# Patient Record
Sex: FEMALE | Race: BLACK OR AFRICAN AMERICAN | NOT HISPANIC OR LATINO | Employment: OTHER | URBAN - METROPOLITAN AREA
[De-identification: names, ages, dates, MRNs, and addresses within clinical notes are randomized per-mention and may not be internally consistent; named-entity substitution may affect disease eponyms.]

---

## 2023-07-02 ENCOUNTER — OFFICE VISIT (OUTPATIENT)
Dept: URGENT CARE | Facility: URGENT CARE | Age: 23
End: 2023-07-02
Payer: COMMERCIAL

## 2023-07-02 VITALS
DIASTOLIC BLOOD PRESSURE: 80 MMHG | SYSTOLIC BLOOD PRESSURE: 114 MMHG | WEIGHT: 194 LBS | HEART RATE: 78 BPM | OXYGEN SATURATION: 100 % | RESPIRATION RATE: 18 BRPM | TEMPERATURE: 97.8 F

## 2023-07-02 DIAGNOSIS — R10.13 ABDOMINAL PAIN, EPIGASTRIC: Primary | ICD-10-CM

## 2023-07-02 LAB
ALBUMIN SERPL-MCNC: 3.9 G/DL (ref 3.4–5)
ALBUMIN UR-MCNC: NEGATIVE MG/DL
ALP SERPL-CCNC: 62 U/L (ref 40–150)
ALT SERPL W P-5'-P-CCNC: 23 U/L (ref 0–50)
ANION GAP SERPL CALCULATED.3IONS-SCNC: 4 MMOL/L (ref 3–14)
APPEARANCE UR: CLEAR
AST SERPL W P-5'-P-CCNC: 25 U/L (ref 0–45)
BACTERIA #/AREA URNS HPF: ABNORMAL /HPF
BASOPHILS # BLD AUTO: 0 10E3/UL (ref 0–0.2)
BASOPHILS NFR BLD AUTO: 1 %
BILIRUB SERPL-MCNC: 0.6 MG/DL (ref 0.2–1.3)
BILIRUB UR QL STRIP: NEGATIVE
BUN SERPL-MCNC: 10 MG/DL (ref 7–30)
CALCIUM SERPL-MCNC: 10.1 MG/DL (ref 8.5–10.1)
CHLORIDE BLD-SCNC: 104 MMOL/L (ref 94–109)
CO2 SERPL-SCNC: 29 MMOL/L (ref 20–32)
COLOR UR AUTO: YELLOW
CREAT SERPL-MCNC: 0.6 MG/DL (ref 0.52–1.04)
EOSINOPHIL # BLD AUTO: 0.1 10E3/UL (ref 0–0.7)
EOSINOPHIL NFR BLD AUTO: 2 %
ERYTHROCYTE [DISTWIDTH] IN BLOOD BY AUTOMATED COUNT: 13.2 % (ref 10–15)
GFR SERPL CREATININE-BSD FRML MDRD: >90 ML/MIN/1.73M2
GLUCOSE BLD-MCNC: 104 MG/DL (ref 70–99)
GLUCOSE UR STRIP-MCNC: NEGATIVE MG/DL
HCT VFR BLD AUTO: 43.2 % (ref 35–47)
HGB BLD-MCNC: 14.1 G/DL (ref 11.7–15.7)
HGB UR QL STRIP: ABNORMAL
IMM GRANULOCYTES # BLD: 0 10E3/UL
IMM GRANULOCYTES NFR BLD: 1 %
KETONES UR STRIP-MCNC: NEGATIVE MG/DL
LEUKOCYTE ESTERASE UR QL STRIP: ABNORMAL
LIPASE SERPL-CCNC: 16 U/L (ref 13–60)
LYMPHOCYTES # BLD AUTO: 2 10E3/UL (ref 0.8–5.3)
LYMPHOCYTES NFR BLD AUTO: 33 %
MCH RBC QN AUTO: 28.7 PG (ref 26.5–33)
MCHC RBC AUTO-ENTMCNC: 32.6 G/DL (ref 31.5–36.5)
MCV RBC AUTO: 88 FL (ref 78–100)
MONOCYTES # BLD AUTO: 0.5 10E3/UL (ref 0–1.3)
MONOCYTES NFR BLD AUTO: 9 %
NEUTROPHILS # BLD AUTO: 3.4 10E3/UL (ref 1.6–8.3)
NEUTROPHILS NFR BLD AUTO: 56 %
NITRATE UR QL: NEGATIVE
PH UR STRIP: 5.5 [PH] (ref 5–7)
PLATELET # BLD AUTO: 313 10E3/UL (ref 150–450)
POTASSIUM BLD-SCNC: 4.3 MMOL/L (ref 3.4–5.3)
PROT SERPL-MCNC: 8.1 G/DL (ref 6.8–8.8)
RBC # BLD AUTO: 4.91 10E6/UL (ref 3.8–5.2)
RBC #/AREA URNS AUTO: ABNORMAL /HPF
SODIUM SERPL-SCNC: 137 MMOL/L (ref 133–144)
SP GR UR STRIP: >=1.03 (ref 1–1.03)
SQUAMOUS #/AREA URNS AUTO: ABNORMAL /LPF
UROBILINOGEN UR STRIP-ACNC: 0.2 E.U./DL
WBC # BLD AUTO: 6.1 10E3/UL (ref 4–11)
WBC #/AREA URNS AUTO: ABNORMAL /HPF

## 2023-07-02 PROCEDURE — 81001 URINALYSIS AUTO W/SCOPE: CPT | Performed by: NURSE PRACTITIONER

## 2023-07-02 PROCEDURE — 99204 OFFICE O/P NEW MOD 45 MIN: CPT | Performed by: NURSE PRACTITIONER

## 2023-07-02 PROCEDURE — 85025 COMPLETE CBC W/AUTO DIFF WBC: CPT | Performed by: NURSE PRACTITIONER

## 2023-07-02 PROCEDURE — 80053 COMPREHEN METABOLIC PANEL: CPT | Performed by: NURSE PRACTITIONER

## 2023-07-02 PROCEDURE — 36415 COLL VENOUS BLD VENIPUNCTURE: CPT | Performed by: NURSE PRACTITIONER

## 2023-07-02 PROCEDURE — 83690 ASSAY OF LIPASE: CPT | Performed by: NURSE PRACTITIONER

## 2023-07-02 RX ORDER — ONDANSETRON 4 MG/1
4 TABLET, ORALLY DISINTEGRATING ORAL EVERY 8 HOURS PRN
Qty: 15 TABLET | Refills: 0 | Status: SHIPPED | OUTPATIENT
Start: 2023-07-02 | End: 2023-07-07

## 2023-07-02 RX ORDER — FAMOTIDINE 20 MG/1
20 TABLET, FILM COATED ORAL 2 TIMES DAILY
Qty: 10 TABLET | Refills: 0 | Status: SHIPPED | OUTPATIENT
Start: 2023-07-02 | End: 2023-07-07

## 2023-07-02 RX ORDER — ONDANSETRON 4 MG/1
4 TABLET, ORALLY DISINTEGRATING ORAL ONCE
Status: COMPLETED | OUTPATIENT
Start: 2023-07-02 | End: 2023-07-02

## 2023-07-02 RX ADMIN — ONDANSETRON 4 MG: 4 TABLET, ORALLY DISINTEGRATING ORAL at 10:32

## 2023-07-02 ASSESSMENT — ENCOUNTER SYMPTOMS
VOMITING: 1
COUGH: 0
HEMATOCHEZIA: 0
DIARRHEA: 0
ABDOMINAL PAIN: 1
FATIGUE: 0
NAUSEA: 1
RHINORRHEA: 0
FEVER: 0
ABDOMINAL DISTENTION: 0
CONSTIPATION: 0

## 2023-07-02 NOTE — LETTER
July 2, 2023      Derek Gerardo  [address] [address]        To Whom It May Concern:    Derek Gerardo  was seen on 07/2/2023.  Please excuse her  until 07/06/2023 due to illness.        Sincerely,        REX LOPEZ CNP

## 2023-07-02 NOTE — PATIENT INSTRUCTIONS
Try the zofran and famotidine for the next few days. If anything worsens or you are still unable to tolerate fluids, please go to ER for further evaluation.

## 2023-07-02 NOTE — PROGRESS NOTES
Assessment & Plan       ICD-10-CM    1. Abdominal pain, epigastric  R10.13 UA Macroscopic with reflex to Microscopic and Culture     CBC with platelets and differential     Comprehensive metabolic panel (BMP + Alb, Alk Phos, ALT, AST, Total. Bili, TP)     Lipase     ondansetron (ZOFRAN ODT) ODT tab 4 mg     UA Macroscopic with reflex to Microscopic and Culture     CBC with platelets and differential     Comprehensive metabolic panel (BMP + Alb, Alk Phos, ALT, AST, Total. Bili, TP)     Lipase     UA Microscopic with Reflex to Culture     ondansetron (ZOFRAN ODT) 4 MG ODT tab     famotidine (PEPCID) 20 MG tablet     Primary Care Referral           Patient instructions:  Take zofran and famotidine as needed. If no improvement in symptoms or any worsening pain please go to ER, otherwise follow up with PCP in next week.     Medical decision making:  Pt presents with vomiting and mid epigastric discomfort x 1 week. Ddx includes GERD, viral etiology, pancreatitis, gastritis, among others. Pt given zofran in office and reports improvement in symptoms with the medication. CBC normal, CMP normal besides slightly elevated blood sugar, and UA normal. Lipase is pending.  Pt notified that we will call her with lipase results if they are abnormal, if they are normal we will not call. This is most likely reflux so will start patient on zofran and famotidine to see if that helps with symptoms. Pt instructed to go to ER if vomiting or pain continue or worsen. Pt agrees with plan.     Results for orders placed or performed in visit on 07/02/23   UA Macroscopic with reflex to Microscopic and Culture     Status: Abnormal    Specimen: Urine, Midstream   Result Value Ref Range    Color Urine Yellow Colorless, Straw, Light Yellow, Yellow    Appearance Urine Clear Clear    Glucose Urine Negative Negative mg/dL    Bilirubin Urine Negative Negative    Ketones Urine Negative Negative mg/dL    Specific Gravity Urine >=1.030 1.003 - 1.035     Blood Urine Trace (A) Negative    pH Urine 5.5 5.0 - 7.0    Protein Albumin Urine Negative Negative mg/dL    Urobilinogen Urine 0.2 0.2, 1.0 E.U./dL    Nitrite Urine Negative Negative    Leukocyte Esterase Urine Trace (A) Negative   Comprehensive metabolic panel (BMP + Alb, Alk Phos, ALT, AST, Total. Bili, TP)     Status: Abnormal   Result Value Ref Range    Sodium 137 133 - 144 mmol/L    Potassium 4.3 3.4 - 5.3 mmol/L    Chloride 104 94 - 109 mmol/L    Carbon Dioxide (CO2) 29 20 - 32 mmol/L    Anion Gap 4 3 - 14 mmol/L    Urea Nitrogen 10 7 - 30 mg/dL    Creatinine 0.60 0.52 - 1.04 mg/dL    Calcium 10.1 8.5 - 10.1 mg/dL    Glucose 104 (H) 70 - 99 mg/dL    Alkaline Phosphatase 62 40 - 150 U/L    AST 25 0 - 45 U/L    ALT 23 0 - 50 U/L    Protein Total 8.1 6.8 - 8.8 g/dL    Albumin 3.9 3.4 - 5.0 g/dL    Bilirubin Total 0.6 0.2 - 1.3 mg/dL    GFR Estimate >90 >60 mL/min/1.73m2   CBC with platelets and differential     Status: None   Result Value Ref Range    WBC Count 6.1 4.0 - 11.0 10e3/uL    RBC Count 4.91 3.80 - 5.20 10e6/uL    Hemoglobin 14.1 11.7 - 15.7 g/dL    Hematocrit 43.2 35.0 - 47.0 %    MCV 88 78 - 100 fL    MCH 28.7 26.5 - 33.0 pg    MCHC 32.6 31.5 - 36.5 g/dL    RDW 13.2 10.0 - 15.0 %    Platelet Count 313 150 - 450 10e3/uL    % Neutrophils 56 %    % Lymphocytes 33 %    % Monocytes 9 %    % Eosinophils 2 %    % Basophils 1 %    % Immature Granulocytes 1 %    Absolute Neutrophils 3.4 1.6 - 8.3 10e3/uL    Absolute Lymphocytes 2.0 0.8 - 5.3 10e3/uL    Absolute Monocytes 0.5 0.0 - 1.3 10e3/uL    Absolute Eosinophils 0.1 0.0 - 0.7 10e3/uL    Absolute Basophils 0.0 0.0 - 0.2 10e3/uL    Absolute Immature Granulocytes 0.0 <=0.4 10e3/uL   UA Microscopic with Reflex to Culture     Status: Abnormal   Result Value Ref Range    Bacteria Urine Few (A) None Seen /HPF    RBC Urine 0-2 0-2 /HPF /HPF    WBC Urine 0-5 0-5 /HPF /HPF    Squamous Epithelials Urine Few (A) None Seen /LPF    Narrative    Urine Culture not  indicated   CBC with platelets and differential     Status: None    Narrative    The following orders were created for panel order CBC with platelets and differential.  Procedure                               Abnormality         Status                     ---------                               -----------         ------                     CBC with platelets and d...[[id number]]                      Final result                 Please view results for these tests on the individual orders.         No follow-ups on file.    At the end of the encounter, I discussed results, diagnosis, medications. Discussed red flags for immediate return to clinic/ER, as well as indications for follow up if no improvement. Patient understood and agreed to plan. Patient was stable for discharge.    Leoncio Reed is a 23 year old female who presents to clinic today the following health issues:  Chief Complaint   Patient presents with     Vomiting     Mid abdomen pain since last week, nausea and vomiting.      Pt reports epigastric pressure, nausea and vomiting x approximately 1 week. Vomiting is intermittent in nature, denies any diarrhea or constipation. Denies fevers.           Review of Systems   Constitutional: Negative for fatigue and fever.   HENT: Negative for congestion, postnasal drip and rhinorrhea.    Respiratory: Negative for cough.    Gastrointestinal: Positive for abdominal pain, nausea and vomiting. Negative for abdominal distention, constipation, diarrhea and hematochezia.       Problem List:  There are no relevant problems documented for this patient.      No past medical history on file.    Social History     Tobacco Use     Smoking status: Not on file     Smokeless tobacco: Not on file   Substance Use Topics     Alcohol use: Not on file           Objective    /80 (BP Location: Left arm, Patient Position: Sitting, Cuff Size: Adult Regular)   Pulse 78   Temp 97.8  F (36.6  C) (Tympanic)   Resp 18   Wt  88 kg (194 lb)   SpO2 100%   Physical Exam  Constitutional:       General: She is not in acute distress.     Appearance: Normal appearance. She is normal weight. She is not ill-appearing, toxic-appearing or diaphoretic.   HENT:      Head: Normocephalic and atraumatic.   Cardiovascular:      Rate and Rhythm: Normal rate.   Pulmonary:      Effort: Pulmonary effort is normal.   Abdominal:      General: Abdomen is flat. Bowel sounds are normal. There is no distension.      Palpations: Abdomen is soft. There is no mass.      Tenderness: There is abdominal tenderness in the epigastric area. There is no right CVA tenderness, left CVA tenderness, guarding or rebound.      Hernia: No hernia is present.   Skin:     General: Skin is warm.      Capillary Refill: Capillary refill takes less than 2 seconds.   Neurological:      Mental Status: She is alert.              REX LOPEZ CNP

## 2023-07-06 ENCOUNTER — OFFICE VISIT (OUTPATIENT)
Dept: INTERNAL MEDICINE | Facility: CLINIC | Age: 23
End: 2023-07-06
Payer: COMMERCIAL

## 2023-07-06 VITALS
DIASTOLIC BLOOD PRESSURE: 79 MMHG | WEIGHT: 197.6 LBS | RESPIRATION RATE: 18 BRPM | OXYGEN SATURATION: 100 % | SYSTOLIC BLOOD PRESSURE: 116 MMHG | BODY MASS INDEX: 31.76 KG/M2 | HEART RATE: 70 BPM | HEIGHT: 66 IN | TEMPERATURE: 97.6 F

## 2023-07-06 DIAGNOSIS — R10.13 DYSPEPSIA: ICD-10-CM

## 2023-07-06 DIAGNOSIS — K59.00 CONSTIPATION, UNSPECIFIED CONSTIPATION TYPE: ICD-10-CM

## 2023-07-06 DIAGNOSIS — R11.2 NAUSEA AND VOMITING, UNSPECIFIED VOMITING TYPE: Primary | ICD-10-CM

## 2023-07-06 LAB — HCG UR QL: NEGATIVE

## 2023-07-06 PROCEDURE — 99213 OFFICE O/P EST LOW 20 MIN: CPT | Performed by: INTERNAL MEDICINE

## 2023-07-06 PROCEDURE — 81025 URINE PREGNANCY TEST: CPT | Performed by: INTERNAL MEDICINE

## 2023-07-06 RX ORDER — OMEPRAZOLE 20 MG/1
20 TABLET, DELAYED RELEASE ORAL DAILY
Qty: 21 TABLET | Refills: 0 | Status: SHIPPED | OUTPATIENT
Start: 2023-07-06 | End: 2023-07-27

## 2023-07-06 NOTE — PROGRESS NOTES
"  Assessment & Plan     Nausea and vomiting, unspecified vomiting type  Dyspepsia  Her symptoms seem most consistent with morning sickness but that she is not pregnant.  She really has minimal other GI symptoms to treat.  Try longer course of PPI see if this helps.  If not consider looking at her gallbladder.  - omeprazole (PRILOSEC OTC) 20 MG EC tablet; Take 1 tablet (20 mg) by mouth daily for 21 days    - HCG qualitative urine    Constipation, unspecified constipation type  See educational materials on fiber intake water intake OTC laxatives as needed            BMI:   Estimated body mass index is 32.38 kg/m  as calculated from the following:    Height as of this encounter: 1.664 m (5' 5.5\").    Weight as of this encounter: 89.6 kg (197 lb 9.6 oz).   Weight management plan: Discussed healthy diet and exercise guidelines    Regular exercise  See Patient Instructions    David Chung MD  [facility]SHAWNA Reed is a 23 year old, presenting for the following health issues:  UC Follow-Up      HPI     ED/UC Followup:    Facility:  [facility]  Date of visit: 07/02/2023  Reason for visit: abdominal pain  Current Status: Still vomiting with constipation,     Constipation  Onset/Duration: ongoing for 1 week  Description:  Frequency of bowel movements: on a daily basis  Consistency of stool: hard stools  Progression of Symptoms: improving  Accompanying signs and symptoms:    Abdominal pain: YES   Rectal pain: No   Blood in stool: No   Nausea/Vomiting: YES   Weight loss or gain: No  History:   Similar problems in past: No  History of abdominal surgery: No  Chronic laxative use: No  New medications: YES- zofran, famotidine  Precipitating or alleviating factors: n/a  Therapies tried and outcome: None        Review of Systems         Objective    /79   Pulse 70   Temp 97.6  F (36.4  C) (Temporal)   Resp 18   Ht 1.664 m (5' 5.5\")   Wt 89.6 kg (197 lb " 9.6 oz)   LMP 05/30/2023 (Approximate)   SpO2 100%   BMI 32.38 kg/m    Body mass index is 32.38 kg/m .  Physical Exam   GENERAL: healthy, alert and no distress  RESP: lungs clear to auscultation - no rales, rhonchi or wheezes  CV: regular rate and rhythm, normal S1 S2, no S3 or S4, no murmur, click or rub, no peripheral edema and peripheral pulses strong  ABDOMEN: soft, nontender, no hepatosplenomegaly, no masses and bowel sounds normal    Results for orders placed or performed in visit on 07/06/23   HCG qualitative urine     Status: Normal   Result Value Ref Range    hCG Urine Qualitative Negative Negative

## 2023-08-19 ENCOUNTER — OFFICE VISIT (OUTPATIENT)
Dept: URGENT CARE | Facility: URGENT CARE | Age: 23
End: 2023-08-19
Payer: COMMERCIAL

## 2023-08-19 VITALS
DIASTOLIC BLOOD PRESSURE: 73 MMHG | OXYGEN SATURATION: 100 % | BODY MASS INDEX: 31.76 KG/M2 | RESPIRATION RATE: 18 BRPM | WEIGHT: 193.8 LBS | HEART RATE: 76 BPM | SYSTOLIC BLOOD PRESSURE: 124 MMHG | TEMPERATURE: 98.2 F

## 2023-08-19 DIAGNOSIS — R11.11 VOMITING WITHOUT NAUSEA, UNSPECIFIED VOMITING TYPE: Primary | ICD-10-CM

## 2023-08-19 PROCEDURE — 99213 OFFICE O/P EST LOW 20 MIN: CPT | Performed by: NURSE PRACTITIONER

## 2023-08-19 RX ORDER — ONDANSETRON 4 MG/1
4 TABLET, ORALLY DISINTEGRATING ORAL EVERY 8 HOURS PRN
Qty: 21 TABLET | Refills: 0 | Status: SHIPPED | OUTPATIENT
Start: 2023-08-19 | End: 2023-08-26

## 2023-08-19 RX ORDER — ONDANSETRON 4 MG/1
4 TABLET, ORALLY DISINTEGRATING ORAL ONCE
Status: COMPLETED | OUTPATIENT
Start: 2023-08-19 | End: 2023-08-19

## 2023-08-19 RX ADMIN — ONDANSETRON 4 MG: 4 TABLET, ORALLY DISINTEGRATING ORAL at 18:52

## 2023-08-19 NOTE — LETTER
August 19, 2023      Derek Gerardo  [address] [place] [id number]        To Whom It May Concern:    Derek Gerardo  was seen on 08/19/2023.  Please excuse her  until 08/21/2023 due to illness.        Sincerely,        DEBBIE AMBROSIO, CNP

## 2023-08-19 NOTE — PATIENT INSTRUCTIONS
If vomiting continues beyond 3 days please get rechecked.      Giving liquids and food  If using oral rehydration solution:  Follow your doctor s instructions when giving the solution to your child.  Use only prepared, purchased oral rehydration solution made for this purpose. Don't make your own solution. This is very important because the homemade solutions and sports drinks may not contain the amounts or ingredients necessary to stop dehydration.  If vomiting or diarrhea gets better after 2 to 3 hours, you can stop oral rehydration solution. You can then restart other clear liquids.  For solid foods:  Follow the diet your doctor advises.  If desired and tolerated, your child may eat regular food.  If your child is an infant and you are breastfeeding, continue to do so unless your healthcare provider directs you stop. If you are feeding formula to your infant, you may try a special oral rehydration solution in small amounts frequently for a few hours. When the vomiting improves, you may restart the formula.  If unable to eat regular food, your child can drink clear liquids such as water, or suck on ice cubes. Do not give high-sugar fluids such as juice or soda.  If clear liquids are tolerated, slowly increase the amount. Alternate these fluids with oral rehydration solution as your doctor advises.  Your child can start a regular diet 12 to 24 hours after diarrhea or vomiting has stopped. Continue to give plenty of clear liquids.  You can resume your child's normal diet over time as he or she feels better. Don t force your child to eat, especially if he or she is having stomach pain or cramping. Don t feed your child large amounts at a time, even if he or she is hungry. This can make your child feel worse. You can give your child more food over time if he or she can tolerate it. Foods you can give include cereal, mashed potatoes, applesauce, mashed bananas, crackers, dry toast, rice, oatmeal, bread, noodles,  pretzels, soups with rice or noodles, and cooked vegetables. As your child improves, you may try lean meats and yogurt.  If the symptoms come back, go back to a simple diet or clear liquids.  Follow-up care  Follow up with your child s healthcare provider, or as advised. If a stool sample was taken or cultures were done, call the healthcare provider for the results as instructed.

## 2023-08-19 NOTE — PROGRESS NOTES
Clinic Administered Medication Documentation    Patient was given Zofran 4mg. Prior to medication administration, verified patient's identity using patient's name and date of birth.    Tomy Max, [facility]    [id number]}

## 2023-10-25 ENCOUNTER — HOSPITAL ENCOUNTER (EMERGENCY)
Facility: CLINIC | Age: 23
Discharge: HOME | End: 2023-10-26
Attending: EMERGENCY MEDICINE | Admitting: EMERGENCY MEDICINE
Payer: COMMERCIAL

## 2023-10-25 VITALS
HEART RATE: 90 BPM | SYSTOLIC BLOOD PRESSURE: 125 MMHG | DIASTOLIC BLOOD PRESSURE: 92 MMHG | TEMPERATURE: 97.5 F | OXYGEN SATURATION: 100 % | RESPIRATION RATE: 16 BRPM

## 2023-10-25 DIAGNOSIS — R51.9 NONINTRACTABLE HEADACHE, UNSPECIFIED CHRONICITY PATTERN, UNSPECIFIED HEADACHE TYPE: ICD-10-CM

## 2023-10-25 DIAGNOSIS — R05.9 COUGH, UNSPECIFIED TYPE: ICD-10-CM

## 2023-10-25 DIAGNOSIS — J34.89 RHINORRHEA: ICD-10-CM

## 2023-10-25 LAB
ALBUMIN UR-MCNC: NEGATIVE MG/DL
APPEARANCE UR: CLEAR
BACTERIA #/AREA URNS HPF: ABNORMAL /HPF
BILIRUB UR QL STRIP: NEGATIVE
COLOR UR AUTO: ABNORMAL
FLUAV RNA SPEC QL NAA+PROBE: NEGATIVE
FLUBV RNA RESP QL NAA+PROBE: NEGATIVE
GLUCOSE UR STRIP-MCNC: NEGATIVE MG/DL
HCG UR QL: NEGATIVE
HGB UR QL STRIP: NEGATIVE
KETONES UR STRIP-MCNC: NEGATIVE MG/DL
LEUKOCYTE ESTERASE UR QL STRIP: ABNORMAL
NITRATE UR QL: NEGATIVE
PH UR STRIP: 6.5 [PH] (ref 5–7)
RBC URINE: <1 /HPF
RSV RNA SPEC NAA+PROBE: NEGATIVE
SARS-COV-2 RNA RESP QL NAA+PROBE: NEGATIVE
SP GR UR STRIP: 1 (ref 1–1.03)
SQUAMOUS EPITHELIAL: 1 /HPF
UROBILINOGEN UR STRIP-MCNC: NORMAL MG/DL
WBC URINE: 2 /HPF

## 2023-10-25 PROCEDURE — 87637 SARSCOV2&INF A&B&RSV AMP PRB: CPT | Performed by: EMERGENCY MEDICINE

## 2023-10-25 PROCEDURE — 258N000003 HC RX IP 258 OP 636: Performed by: EMERGENCY MEDICINE

## 2023-10-25 PROCEDURE — 250N000011 HC RX IP 250 OP 636: Mod: JZ | Performed by: EMERGENCY MEDICINE

## 2023-10-25 PROCEDURE — 96374 THER/PROPH/DIAG INJ IV PUSH: CPT

## 2023-10-25 PROCEDURE — 99284 EMERGENCY DEPT VISIT MOD MDM: CPT | Mod: 25

## 2023-10-25 PROCEDURE — 81025 URINE PREGNANCY TEST: CPT | Performed by: EMERGENCY MEDICINE

## 2023-10-25 PROCEDURE — 81001 URINALYSIS AUTO W/SCOPE: CPT | Performed by: EMERGENCY MEDICINE

## 2023-10-25 PROCEDURE — 96375 TX/PRO/DX INJ NEW DRUG ADDON: CPT

## 2023-10-25 RX ORDER — DIPHENHYDRAMINE HYDROCHLORIDE 50 MG/ML
25 INJECTION INTRAMUSCULAR; INTRAVENOUS ONCE
Status: COMPLETED | OUTPATIENT
Start: 2023-10-25 | End: 2023-10-25

## 2023-10-25 RX ORDER — KETOROLAC TROMETHAMINE 15 MG/ML
15 INJECTION, SOLUTION INTRAMUSCULAR; INTRAVENOUS ONCE
Status: COMPLETED | OUTPATIENT
Start: 2023-10-25 | End: 2023-10-25

## 2023-10-25 RX ORDER — ONDANSETRON 4 MG/1
4 TABLET, ORALLY DISINTEGRATING ORAL ONCE
Status: DISCONTINUED | OUTPATIENT
Start: 2023-10-25 | End: 2023-10-25

## 2023-10-25 RX ORDER — METOCLOPRAMIDE HYDROCHLORIDE 5 MG/ML
10 INJECTION INTRAMUSCULAR; INTRAVENOUS ONCE
Status: COMPLETED | OUTPATIENT
Start: 2023-10-25 | End: 2023-10-25

## 2023-10-25 RX ORDER — IBUPROFEN 600 MG/1
600 TABLET, FILM COATED ORAL ONCE
Status: DISCONTINUED | OUTPATIENT
Start: 2023-10-25 | End: 2023-10-25

## 2023-10-25 RX ADMIN — SODIUM CHLORIDE 1000 ML: 9 INJECTION, SOLUTION INTRAVENOUS at 23:48

## 2023-10-25 RX ADMIN — METOCLOPRAMIDE HYDROCHLORIDE 10 MG: 5 INJECTION INTRAMUSCULAR; INTRAVENOUS at 23:50

## 2023-10-25 RX ADMIN — DIPHENHYDRAMINE HYDROCHLORIDE 25 MG: 50 INJECTION, SOLUTION INTRAMUSCULAR; INTRAVENOUS at 23:50

## 2023-10-25 RX ADMIN — KETOROLAC TROMETHAMINE 15 MG: 15 INJECTION, SOLUTION INTRAMUSCULAR; INTRAVENOUS at 23:50

## 2023-10-25 NOTE — LETTER
October 26, 2023      To Whom It May Concern:      Derek Gerardo was seen in our Emergency Department today, 10/26/23.  Please excuse her from work for the next 2 days.    Sincerely,        Coral Rose RN

## 2023-10-26 NOTE — ED PROVIDER NOTES
ED ATTENDING PHYSICIAN NOTE:   I evaluated this patient in conjunction with Shelli De La Vega PA-C  I have participated in the care of the patient and personally performed key elements of the history, exam, and medical decision making.      HPI:   Derek Gerardo is a generally healthy 23 year old female who presents with 1 day of bilateral frontal headache, congestion, runny nose, nausea, decreased appetite, and photophobia. Patient states that the symptoms all started around the same time last night. She notes that she doesn't get headaches frequently, but this wasn't the worst headache of her life. Patient took Tylenol with some relief. Patient has not been around anyone sick recently. Denies sore throat.       EXAM:   VS: Reviewed per above  HENT: Mucous membranes moist, no nuchal rigidity  EYES: sclera anicteric  CV: Rate as noted,  regular rhythm.   RESP: Effort normal. Breath sounds are normal bilaterally.  NEURO: GCS 15, cranial nerves II through XII are intact, 5 out of 5 strength in all 4 extremities, sensation is intact light touch in all 4 extremities  MSK: No deformity of the extremities  SKIN: Warm and dry    Interventions:  Medications   ketorolac (TORADOL) injection 15 mg (15 mg Intravenous $Given 10/25/23 2350)   diphenhydrAMINE (BENADRYL) injection 25 mg (25 mg Intravenous $Given 10/25/23 2350)   metoclopramide (REGLAN) injection 10 mg (10 mg Intravenous $Given 10/25/23 2350)   sodium chloride 0.9% BOLUS 1,000 mL (0 mLs Intravenous Stopped 10/26/23 0016)          MEDICAL DECISION MAKING/ASSESSMENT AND PLAN:   Patient presents to the ER for evaluation of progressive headache since yesterday in the setting of runny nose, cough, associated photophobia.  Vital signs reassuring.  No fevers or nuchal rigidity to suggest meningitis.  Likewise no encephalopathy to suggest encephalitis.  Nature of headache not consistent with occult aneurysmal subarachnoid hemorrhage.  No focal neurological deficits to suggest  increased ICP or stroke or other CNS lesion.  Constellation of symptoms is concerning for viral syndrome.  After IV fluids and headache medications, she was feeling improved.  Strict return precautions discussed with patient and sister, specifically fevers or neck stiffness or worsening headaches or confusion.   Encouraged ongoing primary care follow-up.     DIAGNOSIS:     ICD-10-CM    1. Nonintractable headache, unspecified chronicity pattern, unspecified headache type  R51.9       2. Rhinorrhea  J34.89       3. Cough, unspecified type  R05.9           DISPOSITION:   discharged     Scribe Disclosure:  ANDRIY NIETO, am serving as a scribe at 11:30 PM on 10/25/2023 to document services personally performed by Jacek Hoskins MD based on my observations and the provider's statements to me.     10/25/2023  [facility] EMERGENCY DEPT     Jacek Hoskins MD  10/26/23 0159       Jacek Hoskins MD  10/26/23 0159

## 2023-10-26 NOTE — ED PROVIDER NOTES
History     Chief Complaint:  Headache       HPI   Derek Gerardo is a 23 year old female who is otherwise generally healthy presenting today for evaluation of 24 hours of a headache with congestion, rhinorrhea, photophobia, cough, and nausea.  Symptoms started last night.  No known sick contacts.  Headache was not a sudden and severe headache and is not the worst of her life.  No fevers at home.  No vomiting, dysuria, hematuria, abdominal pain, shortness of breath. No neck pain. No sore throat.    Independent Historian:   None - Patient Only    Review of External Notes:   Urgent care visit on 8/19/23.    Medications:    No current outpatient medications on file.    Past Medical History:    No past medical history on file.    Past Surgical History:    No past surgical history on file.     Physical Exam   Patient Vitals for the past 24 hrs:   BP Temp Temp src Pulse Resp SpO2   10/25/23 1911 (!) 125/92 -- -- -- -- --   10/25/23 1909 -- 97.5  F (36.4  C) Temporal 90 16 100 %        Physical Exam  BP (!) 125/92   Pulse 90   Temp 97.5  F (36.4  C) (Temporal)   Resp 16   LMP 10/06/2023 (Approximate)   SpO2 100%    General: Sitting upright in chair.  Examined in FT05.  Sleeping upon my entry to the room.  Head: Atraumatic. Normocephalic.  Neck: No meningismus. Full painless range of motion.  EENT: PERRL. EOMI. Moist mucus membranes. No oropharyngeal erythema or edema.  Uvula midline.  CV: Regular rate and rhythm. No appreciable murmurs, rubs, or gallops.   Respiratory: Breathing comfortably on room air. Lungs clear to auscultation bilaterally without wheezes, rhonchi, or rales.  GI: Soft, non-distended. Non-tender abdomen. No rebound, rigidity, or guarding.   Msk: Extremities without tenderness to palpation or deformity.  Skin: Warm and dry. No rashes.  Neuro: Awake, alert, and conversant. No focal neurologic deficits.   Psych: Appropriate mood and affect.    Emergency Department Course   Laboratory:  Labs Ordered and  Resulted from Time of ED Arrival to Time of ED Departure   ROUTINE UA WITH MICROSCOPIC REFLEX TO CULTURE - Abnormal       Result Value    Color Urine Straw      Appearance Urine Clear      Glucose Urine Negative      Bilirubin Urine Negative      Ketones Urine Negative      Specific Gravity Urine 1.003      Blood Urine Negative      pH Urine 6.5      Protein Albumin Urine Negative      Urobilinogen Urine Normal      Nitrite Urine Negative      Leukocyte Esterase Urine Trace (*)     Bacteria Urine Few (*)     RBC Urine <1      WBC Urine 2      Squamous Epithelials Urine 1     INFLUENZA A/B, RSV, & SARS-COV2 PCR - Normal    Influenza A PCR Negative      Influenza B PCR Negative      RSV PCR Negative      SARS CoV2 PCR Negative     HCG QUALITATIVE URINE - Normal    hCG Urine Qualitative Negative        Emergency Department Course & Assessments:  Interventions:  Medications   ketorolac (TORADOL) injection 15 mg (15 mg Intravenous $Given 10/25/23 2350)   diphenhydrAMINE (BENADRYL) injection 25 mg (25 mg Intravenous $Given 10/25/23 2350)   metoclopramide (REGLAN) injection 10 mg (10 mg Intravenous $Given 10/25/23 2350)   sodium chloride 0.9% BOLUS 1,000 mL (0 mLs Intravenous Stopped 10/26/23 0016)        Assessments and Consultations:  Patient was seen in conjunction with attending physician Dr. Hoskins.    2300   I performed my initial evaluation of the patient    Independent Interpretation (X-rays, CTs, rhythm strip):  None    Social Determinants of Health affecting care:   None    Disposition:  The patient was discharged to home.     Impression & Plan    Medical Decision Makin year old female presenting as above. Vital signs stable upon arrival.  Differential diagnosis included SAH, meningitis, intracranial mass, viral syndrome.  With associated viral symptoms, highly suspicious of a viral syndrome.  No nuchal rigidity or neck pain to suggest meningitis.  Headache was not sudden in onset or worst of her life  to suggest SAH.  She was given the above interventions here with improvement in her symptoms.  Discussed symptomatic management at home.  I discussed the plan with the patient. All questions were answered and they were in agreement the plan. We discussed signs and symptoms that should prompt immediate reevaluation, and they vocalized understanding. Patient is safe for discharge to home.     Diagnosis:    ICD-10-CM    1. Nonintractable headache, unspecified chronicity pattern, unspecified headache type  R51.9       2. Rhinorrhea  J34.89       3. Cough, unspecified type  R05.9            Shelli De La Vega PA-C  October 26, 2023   [facility]           Shelli De La Vega PA-C  10/26/23 0030

## 2023-10-26 NOTE — DISCHARGE INSTRUCTIONS
Discharge Instructions  Headache    You were seen today for a headache. Headaches may be caused by many different things such as muscle tension, sinus inflammation, anxiety and stress, having too little sleep, too much alcohol, some medical conditions or injury. You may have a migraine, which is caused by changes in the blood vessels in your head.  At this time your provider does not find that your headache is a sign of anything dangerous or life-threatening.  However, sometimes the signs of serious illness do not show up right away.      Generally, every Emergency Department visit should have a follow-up clinic visit with either a primary or a specialty clinic/provider. Please follow-up as instructed by your emergency provider today.    Return to the Emergency Department if:  You get a new fever of 100.4 F or higher.  Your headache gets much worse.  You get a stiff neck with your headache.  You get a new headache that is significantly different or worse than headaches you have had before.  You are vomiting (throwing up) and cannot keep food or water down.  You have blurry or double vision or other problems with your eyes.  You have a new weakness on one side of your body.  You have difficulty with balance which is new.  You or your family thinks you are confused.  You have a seizure.    What can I do to help myself?  Pain medications - You may take a pain medication such as Tylenol  (acetaminophen), Advil , Motrin  (ibuprofen) or Aleve  (naproxen).  Take a pain reliever as soon as you notice symptoms.  Starting medications as soon as you start to have symptoms may lessen the amount of pain you have.  Relaxing in a quiet, dark room may help.  Get enough sleep and eat meals regularly.  You may need to watch for certain foods or other things which may trigger your headaches.  Keeping a journal of your headaches and possible triggers may help you and your primary provider to identify things which you should avoid which  may be causing your headaches.  If you were given a prescription for medicine here today, be sure to read all of the information (including the package insert) that comes with your prescription.  This will include important information about the medicine, its side effects, and any warnings that you need to know about.  The pharmacist who fills the prescription can provide more information and answer questions you may have about the medicine.  If you have questions or concerns that the pharmacist cannot address, please call or return to the Emergency Department.   Remember that you can always come back to the Emergency Department if you are not able to see your regular provider in the amount of time listed above, if you get any new symptoms, or if there is anything that worries you.    Discharge Instructions  Upper Respiratory Infection    The upper respiratory tract includes the sinuses, nasal passages, pharynx, and larynx. A URI, or upper respiratory infection, is an infection of any of the parts of the upper airway. Symptoms include runny nose, congestion, sneezing, sore throat, cough, and fever. URIs are almost always caused by a virus. Antibiotics do not help with viral infections, so are generally not prescribed. A URI is very contagious through coughing and nasal secretions; make sure you wash your hands often and clean surfaces after sneezing, coughing or touching them. While you should start to improve in 3 - 5 days, remember that sometimes a cough can linger for several weeks.    Generally, every Emergency Department visit should have a follow-up clinic visit with either a primary or a specialty clinic/provider. Please follow-up as instructed by your emergency provider today.    Return to the Emergency Department if:  Any of your symptoms get much worse.  You seem very sick, like being too weak to get up.  You have chest pain or shortness of breath.   You have a severe headache.  You are vomiting (throwing  up) so much you cannot keep fluids or medicines down.  You have confusion or seem unusually drowsy.  You have a seizure.    What can I do to help myself?  Fill any prescriptions the provider gave you and take them right away  If you have a fever, get plenty of rest and drink lots of fluids, especially water.  Using a humidifier or saline nose spray will also help loosen mucous.   Clothes or blankets will not change your fever. Do what is comfortable for you.  Bathing or sponging in lukewarm water may help you feel better.  Acetaminophen (Tylenol ) or ibuprofen (Advil , Motrin ) will help bring fever down and may help you feel more comfortable. Be sure to read and follow the package directions, and ask your provider if you have questions.  Do not drink alcohol.  Decongestants may help you feel better. You may use decongestant nose sprays Afrin  (oxymetazoline) or Barry-Synephrine  (phenylephrine hydrochloride) for up to 3 days, or may use a decongestant tablet like Sudafed  (pseudoephedrine).  If you were given a prescription for medicine here today, be sure to read all of the information (including the package insert) that comes with your prescription.  This will include important information about the medicine, its side effects, and any warnings that you need to know about.  The pharmacist who fills the prescription can provide more information and answer questions you may have about the medicine.  If you have questions or concerns that the pharmacist cannot address, please call or return to the Emergency Department.   Remember that you can always come back to the Emergency Department if you are not able to see your regular provider in the amount of time listed above, if you get any new symptoms, or if there is anything that worries you.

## 2023-10-26 NOTE — ED TRIAGE NOTES
Patient presents with headache that started yesterday, also complaining of nausea, runny nose and cold symptoms but no fever or vomiting.  States that light also bothers her eyes.  No other neuro symptoms.     Triage Assessment (Adult)       Row Name 10/25/23 1910          Triage Assessment    Airway WDL WDL        Respiratory WDL    Respiratory WDL WDL        Skin Circulation/Temperature WDL    Skin Circulation/Temperature WDL WDL        Cardiac WDL    Cardiac WDL WDL     Cardiac Rhythm NSR        Peripheral/Neurovascular WDL    Peripheral Neurovascular WDL WDL        Cognitive/Neuro/Behavioral WDL    Cognitive/Neuro/Behavioral WDL WDL

## 2024-05-18 ENCOUNTER — HOSPITAL ENCOUNTER (EMERGENCY)
Facility: CLINIC | Age: 24
Discharge: HOME | End: 2024-05-18
Attending: EMERGENCY MEDICINE | Admitting: EMERGENCY MEDICINE

## 2024-05-18 VITALS
RESPIRATION RATE: 16 BRPM | HEART RATE: 85 BPM | BODY MASS INDEX: 28.79 KG/M2 | WEIGHT: 190 LBS | OXYGEN SATURATION: 100 % | SYSTOLIC BLOOD PRESSURE: 146 MMHG | DIASTOLIC BLOOD PRESSURE: 91 MMHG | TEMPERATURE: 97.6 F | HEIGHT: 68 IN

## 2024-05-18 DIAGNOSIS — M26.609 TMJ (TEMPOROMANDIBULAR JOINT DISORDER): ICD-10-CM

## 2024-05-18 PROCEDURE — 99283 EMERGENCY DEPT VISIT LOW MDM: CPT

## 2024-05-18 PROCEDURE — 250N000013 HC RX MED GY IP 250 OP 250 PS 637: Performed by: EMERGENCY MEDICINE

## 2024-05-18 RX ORDER — IBUPROFEN 600 MG/1
600 TABLET, FILM COATED ORAL ONCE
Status: COMPLETED | OUTPATIENT
Start: 2024-05-18 | End: 2024-05-18

## 2024-05-18 RX ADMIN — IBUPROFEN 600 MG: 600 TABLET ORAL at 08:12

## 2024-05-18 ASSESSMENT — ACTIVITIES OF DAILY LIVING (ADL): ADLS_ACUITY_SCORE: 35

## 2024-05-18 NOTE — ED PROVIDER NOTES
"  Emergency Department Note      History of Present Illness     Chief Complaint  Facial Pain    HPI  Derek Gerardo is a 24 year old female presenting for evaluation of facial pain. Derek reports intermittent right-sided facial pain since yesterday. She notes use of Tylenol for pain, with her last dose at 0100 this morning. She denies fevers, chills, pharyngitis, dysphagia, rhinorrhea, cough, tooth pain, or ear drainage. She denies recent trauma to the area. She denies a history of similar pain. She denies recent plane travel.    Independent Historian  None    Review of External Notes      Past Medical History   Medical History and Problem List  The patient has no known pertinent past medical history.    Medications  Prilosec    Physical Exam   Patient Vitals for the past 24 hrs:   BP Temp Temp src Pulse Resp SpO2 Height Weight   05/18/24 0746 (!) 146/91 97.6  F (36.4  C) Temporal 85 16 100 % 1.737 m (5' 8.4\") 86.2 kg (190 lb)     Physical Exam  Constitutional: 24 year old [place] female, supine. No respiratory distress.  HENT: No signs of trauma. TMs clear bilaterally. EAC normal bilaterally. No tenderness with pull of the tragus right ear. Tenderness over TMJ on right with chewing. Dentition in tact, braces present, no tenderness. No other facial tenderness on palpation.  Neck: Normal range of motion. No JVD present. No cervical adenopathy. Trachea midline.  Neurological: Alert and oriented to person, place, and time. Normal strength. Coordination normal. No facial asymmetry. Normal motor sensation.    Diagnostics   Independent Interpretation  None    ED Course    Medications Administered  Medications   ibuprofen (ADVIL/MOTRIN) tablet 600 mg (has no administration in time range)     Discussion of Management  None    Social Determinants of Health adding to complexity of care  None    ED Course  ED Course as of 05/18/24 0805   Sat May 18, 2024   0751 I obtained history and examined the patient as noted above. I " discussed findings and discharge with the patient. All questions answered.      Medical Decision Making / Diagnosis   MIPS     None    MDM  This is a 24 year old female that presents with right facial discomfort since last night. She tried some Tylenol but it did not help. She denies sore throat, any dental problems, any drainage from the ear, or ear pain. On exam, she has tenderness over the right TMJ, especially with chewing. I think that this is a TMJ related problem. I will give her ibuprofen. I have told her not to chew hard foods while this is flaring up and if it persists to follow up with her own dentist.    Disposition  The patient was discharged.     ICD-10 Codes:    ICD-10-CM    1. TMJ (temporomandibular joint disorder)  M26.609         Scribe Disclosure:  I, Lizabeth Long, am serving as a scribe at 7:50 AM on 5/18/2024 to document services personally performed by Franky Bamu MD based on my observations and the provider's statements to me.        Franky Baum MD  05/18/24 7774